# Patient Record
Sex: FEMALE | Race: BLACK OR AFRICAN AMERICAN | Employment: FULL TIME | URBAN - METROPOLITAN AREA
[De-identification: names, ages, dates, MRNs, and addresses within clinical notes are randomized per-mention and may not be internally consistent; named-entity substitution may affect disease eponyms.]

---

## 2019-06-26 ENCOUNTER — HOSPITAL ENCOUNTER (EMERGENCY)
Age: 34
Discharge: HOME | End: 2019-06-26
Payer: MEDICARE

## 2019-06-26 VITALS
DIASTOLIC BLOOD PRESSURE: 100 MMHG | BODY MASS INDEX: 28.67 KG/M2 | OXYGEN SATURATION: 100 % | WEIGHT: 193.56 LBS | HEIGHT: 69 IN | TEMPERATURE: 98.2 F | SYSTOLIC BLOOD PRESSURE: 136 MMHG | HEART RATE: 82 BPM | RESPIRATION RATE: 16 BRPM

## 2019-06-26 DIAGNOSIS — T16.2XXA FOREIGN BODY OF LEFT EAR, INITIAL ENCOUNTER: Primary | ICD-10-CM

## 2019-06-26 PROCEDURE — 99282 EMERGENCY DEPT VISIT SF MDM: CPT

## 2019-06-26 ASSESSMENT — ENCOUNTER SYMPTOMS
COLOR CHANGE: 0
NAUSEA: 0
VOMITING: 0

## 2019-06-27 NOTE — ED PROVIDER NOTES
[address]  [address] [facility] [address]  Dept: [phone]  Loc: [phone]  eMERGENCYdEPARTMENT eNCOUnter      Pt Name: Jorge Mike  MRN: [id number]  Meghan 1985  Date of evaluation: 6/26/2019  Provider:Monae Arriaga PA-C    CHIEF COMPLAINT       Chief Complaint   Patient presents with    Foreign Body in Ear     states part of a cottom tip applicator retained in left ear x 2 hours       HISTORY OF PRESENT ILLNESS  (Location/Symptom, Timing/Onset, Context/Setting, Quality, Duration,Modifying Factors, Severity.)   Jorge Mike is a 27 y.o. female who presents to the emergency department by private vehicle complaining of cotton-tipped retained in left ear since earlier this evening. Patient denies any associated pain. Patient cannot get gotten out which prompt evaluation in the ED. No other complaints this time. Nursing Notes were reviewedand agreed with or any disagreements were addressed in the HPI. REVIEW OF SYSTEMS    (2-9 systems for level 4, 10 or more for level 5)     Review of Systems   Constitutional: Negative for chills and fever. HENT: Positive for ear pain. Negative for ear discharge. Gastrointestinal: Negative for nausea and vomiting. Genitourinary: Negative. Musculoskeletal: Negative for arthralgias and myalgias. Skin: Negative for color change, pallor, rash and wound. Neurological: Negative for dizziness and light-headedness. Psychiatric/Behavioral: Negative for behavioral problems and confusion. Except as noted above the remainder of the review of systems was reviewed and negative. PAST MEDICAL HISTORY   History reviewed. No pertinent past medical history. SURGICAL HISTORY     History reviewed. No pertinent surgical history. CURRENT MEDICATIONS     [unfilled]    ALLERGIES     Patient has no known allergies. FAMILY HISTORY     History reviewed.  No pertinent family history. No family status information on file. SOCIAL HISTORY      reports that she has never smoked. She has never used smokeless tobacco. She reports that she drinks alcohol. She reports that she does not use drugs. PHYSICAL EXAM    (up to 7 for level 4, 8 or more for level 5)     ED Triage Vitals [06/26/19 2137]   Enc Vitals Group      BP (!) 162/103      Pulse 112      Resp 16      Temp 98.2 °F (36.8 °C)      Temp Source Oral      SpO2 100 %      Weight 193 lb 9 oz (87.8 kg)      Height 5' 9\" (1.753 m)      Head Circumference       Peak Flow       Pain Score       Pain Loc       Pain Edu? Excl. in [address]? Physical Exam   Constitutional: She is oriented to person, place, and time. She appears well-developed and well-nourished. HENT:   Head: Normocephalic and atraumatic. Cotton noted in the left external ear canal   Eyes: Conjunctivae and EOM are normal.   Neck: Normal range of motion. Neck supple. Pulmonary/Chest: Effort normal. No respiratory distress. Neurological: She is alert and oriented to person, place, and time. Skin: Skin is warm. She is not diaphoretic. No erythema. Psychiatric: She has a normal mood and affect. Her behavior is normal.   Vitals reviewed. DIAGNOSTIC RESULTS     EKG: All EKG's are interpreted by the Emergency Department Physician who either signs or Co-signs this chart in the absence of a cardiologist.    RADIOLOGY:   Non-plain film images such as CT, Ultrasound and MRI are read by the radiologist. Plain radiographic images are visualized and preliminarilyinterpreted by the emergency physician with the below findings:    Interpretation per the Radiologist below,if available at the time of this note:    No orders to display         LABS:  Labs Reviewed - No data to display    All other labs were within normal range or not returned as of this dictation.     EMERGENCY DEPARTMENT COURSE and DIFFERENTIAL DIAGNOSIS/MDM:   Vitals: Vitals:    06/26/19 2137 06/26/19 2227   BP: (!) 162/103 (!) 136/100   Pulse: 112 82   Resp: 16 16   Temp: 98.2 °F (36.8 °C)    TempSrc: Oral    SpO2: 100%    Weight: 193 lb 9 oz (87.8 kg)    Height: 5' 9\" (1.753 m)        MDM    Patient presents ED with HPI noted above. Upon arrival she is tachycardic, blood pressure elevated. Will reassess prior to discharge, patient anxious regarding retained foreign body. She is afebrile and nontoxic-appearing. She is not hypoxic with oxygen saturation of 100% on room air. Physical exam as above. Audra Vogel was removed from left ear as noted above. No retained foreign body noted, TM clear without erythema at final evaluation. No additional work-up indicated at this time. No evidence of infection. Discharged home in stable condition. PCP referral provided time of discharge. Heart rate improved upon evaluation. Blood pressure still elevated, patient states she is extremely nervous as she was here alone today. Will have blood pressure checked by PCP a ED follow-up visit. The patient tolerated their visit well. I have discussed the findings of today's workup with the patient and addressed the patient's questions and concerns. Important warning signs as well as new or worsening symptoms which would necessitate immediate return to the ED were discussed. The plan is to discharge from the ED at this time, and the patient is in stable condition. The patient acknowledged understanding is agreeable with this plan.       CONSULTS:  None    PROCEDURES:  Foreign Body  Date/Time: 6/26/2019 10:28 PM  Performed by: Qiana Loving PA-C  Authorized by: Qiana Loving PA-C     Consent:     Consent obtained:  Verbal    Consent given by:  Patient    Risks discussed:  Pain and incomplete removal    Alternatives discussed:  No treatment  Location:     Location:  Ear    Ear location:  L ear    Tendon involvement:  None  Pre-procedure details:     Imaging:  None  Anesthesia (see MAR for exact dosages): Anesthesia method:  None  Procedure type:     Procedure complexity:  Simple  Procedure details:     Removal mechanism: Forceps    Foreign bodies recovered:  1    Intact foreign body removal: yes    Post-procedure details:     Confirmation:  No additional foreign bodies on visualization    Skin closure:  None    Patient tolerance of procedure: Tolerated well, no immediate complications        FINAL IMPRESSION      1. Foreign body of left ear, initial encounter          DISPOSITION/PLAN   [unfilled]    PATIENT REFERRED TO:  [facility] Emergency Department  [address]  [phone]  Go to   If symptoms worsen    [facility]  [phone]  Schedule an appointment as soon as possible for a visit in 3 days  For follow up and reevaluation. DISCHARGE MEDICATIONS:  There are no discharge medications for this patient.       (Please note that portions of this note were completed with a voice recognition program.  Efforts were made to edit the dictations but occasionally words are mis-transcribed.)    [address] [facility]LL[facility]          [address] [place] [address]  06/26/19 1741

## 2019-06-27 NOTE — ED NOTES
Ann-Marie TAPIA at bedside eval pt. Cotton tip removed from left ear. Pt amalia well.      Jose Osullivan RN  06/26/19 2956